# Patient Record
Sex: FEMALE | Race: WHITE | NOT HISPANIC OR LATINO | Employment: FULL TIME | URBAN - METROPOLITAN AREA
[De-identification: names, ages, dates, MRNs, and addresses within clinical notes are randomized per-mention and may not be internally consistent; named-entity substitution may affect disease eponyms.]

---

## 2018-11-18 ENCOUNTER — OFFICE VISIT (OUTPATIENT)
Dept: URGENT CARE | Age: 28
End: 2018-11-18
Payer: COMMERCIAL

## 2018-11-18 VITALS
HEART RATE: 81 BPM | WEIGHT: 167 LBS | HEIGHT: 63 IN | DIASTOLIC BLOOD PRESSURE: 55 MMHG | RESPIRATION RATE: 16 BRPM | TEMPERATURE: 98.6 F | SYSTOLIC BLOOD PRESSURE: 118 MMHG | BODY MASS INDEX: 29.59 KG/M2 | OXYGEN SATURATION: 98 %

## 2018-11-18 DIAGNOSIS — H10.9 CONJUNCTIVITIS OF BOTH EYES, UNSPECIFIED CONJUNCTIVITIS TYPE: Primary | ICD-10-CM

## 2018-11-18 PROCEDURE — 99203 OFFICE O/P NEW LOW 30 MIN: CPT | Performed by: PHYSICIAN ASSISTANT

## 2018-11-18 RX ORDER — OFLOXACIN 3 MG/ML
1 SOLUTION/ DROPS OPHTHALMIC 4 TIMES DAILY
Qty: 5 ML | Refills: 0 | Status: SHIPPED | OUTPATIENT
Start: 2018-11-18 | End: 2018-11-25

## 2018-11-18 NOTE — PATIENT INSTRUCTIONS
Use Ofloxacin as prescribed  Do not wear contact lenses for duration of treatment  Follow up with ophthalmologist or optometrist within 24-72 hours if not resolved  Tylenol/Ibuprofen for pain  Change pillowcase daily  Do not wear eye makeup while symptoms persist  Follow up with PCP in 3-5 days  Proceed to  ER if symptoms worsen  Conjunctivitis   WHAT YOU SHOULD KNOW:   Conjunctivitis, or pink eye, is inflammation of your conjunctiva  The conjunctiva is a thin tissue that covers the front of your eye and the back of your eyelids  The conjunctiva helps protect your eye and keep it moist         INSTRUCTIONS:   Medicines:   · Allergy medicine: This medicine helps decrease itchy, red, swollen eyes caused by allergies  It may be given as a pill, eye drops, or nasal spray  · Antibiotics:  You will need antibiotics if your conjunctivitis is caused by bacteria  This medicine may be given as eye drops or eye ointment  · Steroid medicine: This medicine helps decrease inflammation  It may be given as a pill, eye drops, or nasal spray  · Take your medicine as directed  Call your healthcare provider if you think your medicine is not helping or if you have side effects  Tell him if you are allergic to any medicine  Keep a list of the medicines, vitamins, and herbs you take  Include the amounts, and when and why you take them  Bring the list or the pill bottles to follow-up visits  Carry your medicine list with you in case of an emergency  Follow up with your primary healthcare provider as directed: You may need to return for more tests on your eyes  These will help your primary healthcare provider check for eye damage  Write down your questions so you remember to ask them during your visits  Avoid the spread of conjunctivitis:   · Wash your hands often:  Wash your hands before you touch your eyes  Also wash your hands before you prepare or eat food and after you use the bathroom or change a diaper      · Avoid allergens:  Try to avoid the things that cause your allergies, such as pets, dust, or grass  · Avoid contact:  Do not share towels or washcloths  Try to stay away from others as much as possible  Ask when you can return to work or school  · Throw away eye makeup:  Throw away mascara and other eye makeup  Manage your symptoms:  · Apply a cool compress:  Wet a washcloth with cold water and place it on your eye  This will help decrease swelling  · Use eye drops:  Eye drops, or artificial tears, can be bought without a doctor's order  They help keep your eye moist     · Do not wear contact lenses: They can irritate your eye  Throw away the pair you are using and ask when you can wear them again  Use a new pair of lenses when your primary healthcare provider says it is okay  · Flush your eye:  You may need to flush your eye with saline to help decrease your symptoms  Ask for more information on how to flush your eye  Contact your primary healthcare provider if:   · Your eyesight becomes blurry  · You have tiny bumps or spots of blood on your eye  · You have questions or concerns about your condition or care  Return to the emergency department if:   · The swelling in your eye gets worse, even after treatment  · Your vision suddenly becomes worse or you cannot see at all  · Your eye begins to bleed  © 2014 [address] is for End User's use only and may not be sold, redistributed or otherwise used for commercial purposes  All illustrations and images included in CareNotes® are the copyrighted property of [employer] D [employer]  or Shmuel Reyes  The above information is an  only  It is not intended as medical advice for individual conditions or treatments  Talk to your doctor, nurse or pharmacist before following any medical regimen to see if it is safe and effective for you

## 2018-11-18 NOTE — PROGRESS NOTES
[facility]        NAME: Yovany Zarate is a 33 y o  female  : 1990    MRN: [id number]  DATE: 2018  TIME: 10:59 AM    Assessment and Plan   Conjunctivitis of both eyes, unspecified conjunctivitis type [H10 9]  1  Conjunctivitis of both eyes, unspecified conjunctivitis type  ofloxacin (OCUFLOX) 0 3 % ophthalmic solution         Patient Instructions     Use Ofloxacin as prescribed  Do not wear contact lenses for duration of treatment  Follow up with ophthalmologist or optometrist within 24-72 hours if not resolved  Tylenol/Ibuprofen for pain  Change pillowcase daily  Do not wear eye makeup while symptoms persist  Follow up with PCP in 3-5 days  Proceed to  ER if symptoms worsen  Chief Complaint     Chief Complaint   Patient presents with    Eye Redness     3 days; itchy red, and crusty; wears contacts         History of Present Illness       Eye Problem    Both eyes are affected  This is a new problem  The current episode started in the past 7 days  The problem occurs constantly  The problem has been gradually worsening  The injury mechanism was contact lenses  The pain is mild (worse at night)  There is no known exposure to pink eye  She wears contacts  Associated symptoms include an eye discharge (crusting and mucopurulent discharge), eye redness and itching  Pertinent negatives include no blurred vision, double vision, fever, foreign body sensation, nausea, photophobia, recent URI or vomiting  Associated symptoms comments: Difficulty opening eyes in morning due to discharge    Treatments tried: OTC homeopathic eye drops  The treatment provided mild relief  Review of Systems   Review of Systems   Constitutional: Negative for activity change, appetite change and fever  HENT: Negative  Eyes: Positive for discharge (crusting and mucopurulent discharge), redness and itching  Negative for blurred vision, double vision, photophobia, pain and visual disturbance  Gastrointestinal: Negative for abdominal pain, nausea and vomiting  Musculoskeletal: Negative for arthralgias and joint swelling  Neurological: Negative for dizziness, light-headedness and headaches  Current Medications       Current Outpatient Prescriptions:     ofloxacin (OCUFLOX) 0 3 % ophthalmic solution, Administer 1 drop to both eyes 4 (four) times a day for 7 days, Disp: 5 mL, Rfl: 0    Current Allergies     Allergies as of 11/18/2018 - never reviewed   Allergen Reaction Noted    Penicillins  11/18/2018    Sulfa antibiotics  11/18/2018            The following portions of the patient's history were reviewed and updated as appropriate: allergies, current medications, past family history, past medical history, past social history, past surgical history and problem list      No past medical history on file  No past surgical history on file  No family history on file  Medications have been verified  Objective   /55   Pulse 81   Temp 98 6 °F (37 °C)   Resp 16   Ht 5' 3" (1 6 m)   Wt 75 8 kg (167 lb)   SpO2 98%   BMI 29 58 kg/m²        Physical Exam     Physical Exam   Constitutional: She appears well-developed and well-nourished  No distress  HENT:   Right Ear: External ear normal    Left Ear: External ear normal    Mouth/Throat: Oropharynx is clear and moist    Eyes: Pupils are equal, round, and reactive to light  EOM are normal  Right eye exhibits no discharge  Left eye exhibits no discharge  Scleral injected and conjunctival erythematous bilaterally  Cardiovascular: Normal rate, regular rhythm and normal heart sounds  Exam reveals no gallop and no friction rub  No murmur heard  Pulmonary/Chest: Effort normal and breath sounds normal  No respiratory distress  She has no wheezes  She has no rales  She exhibits no tenderness  Lymphadenopathy:     She has no cervical adenopathy  Neurological: She is alert  Skin: Skin is warm  She is not diaphoretic  Psychiatric: She has a normal mood and affect  Her behavior is normal  Judgment and thought content normal    Vitals reviewed

## 2019-07-11 ENCOUNTER — APPOINTMENT (EMERGENCY)
Dept: CT IMAGING | Facility: HOSPITAL | Age: 29
End: 2019-07-11
Payer: COMMERCIAL

## 2019-07-11 ENCOUNTER — HOSPITAL ENCOUNTER (EMERGENCY)
Facility: HOSPITAL | Age: 29
Discharge: HOME | End: 2019-07-11
Attending: EMERGENCY MEDICINE | Admitting: EMERGENCY MEDICINE
Payer: COMMERCIAL

## 2019-07-11 VITALS
TEMPERATURE: 98.2 F | WEIGHT: 164 LBS | BODY MASS INDEX: 29.06 KG/M2 | SYSTOLIC BLOOD PRESSURE: 139 MMHG | OXYGEN SATURATION: 100 % | HEIGHT: 63 IN | RESPIRATION RATE: 18 BRPM | DIASTOLIC BLOOD PRESSURE: 68 MMHG | HEART RATE: 77 BPM

## 2019-07-11 DIAGNOSIS — R19.7 DIARRHEA: ICD-10-CM

## 2019-07-11 DIAGNOSIS — G43.809 OTHER MIGRAINE WITHOUT STATUS MIGRAINOSUS, NOT INTRACTABLE: Primary | ICD-10-CM

## 2019-07-11 LAB
EXT PREG TEST URINE: NEGATIVE
EXT. CONTROL ED NAV: NORMAL

## 2019-07-11 PROCEDURE — 70450 CT HEAD/BRAIN W/O DYE: CPT

## 2019-07-11 PROCEDURE — 81025 URINE PREGNANCY TEST: CPT

## 2019-07-11 PROCEDURE — 99284 EMERGENCY DEPT VISIT MOD MDM: CPT

## 2019-07-11 NOTE — DISCHARGE INSTRUCTIONS
Can continue Excedrin for headache as needed  Try Pepto-Bismol for diarrhea or if it becomes severe try Imodium    Follow up with neurologist
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
## 2022-01-12 ENCOUNTER — NURSE TRIAGE (OUTPATIENT)
Dept: OTHER | Facility: OTHER | Age: 32
End: 2022-01-12

## 2022-01-12 DIAGNOSIS — Z20.822 SUSPECTED SEVERE ACUTE RESPIRATORY SYNDROME CORONAVIRUS 2 (SARS-COV-2) INFECTION: Primary | ICD-10-CM

## 2022-01-12 NOTE — TELEPHONE ENCOUNTER
1  Were you within 6 feet or less, for up to 15 minutes or more with a person that has a confirmed COVID-19 test? Yes-so-worker- Positive results 1/10/22  2  What was the date of your exposure? 1/8/22  3  Are you experiencing any symptoms attributed to the virus?  (Assess for SOB, cough, fever, difficulty breathing) runny nose, cough, congestion, headache - s/s yesterday 1/11/22  4  HIGH RISK: Do you have any history heart or lung conditions, weakened immune system, diabetes, Asthma, CHF, HIV, COPD, Chemo, renal failure, sickle cell, etc? Denies  5  PREGNANCY: Are you pregnant or did you recently give birth? NO- ? LMP-Tubal ligation  6   VACCINE: "Have you gotten the COVID-19 vaccine?" If Yes ask: "Which one, how many shots, when did you get it?" 2 doses of Pfizer vaccine-  Booster also    Reason for Disposition   [1] COVID-19 infection suspected by caller or triager AND [2] mild symptoms (cough, fever, or others) AND [3] has not gotten tested yet    Protocols used: CORONAVIRUS (COVID-19) DIAGNOSED OR SUSPECTED-ADULT-[place]

## 2022-01-12 NOTE — TELEPHONE ENCOUNTER
Regarding: COVID, runny nose, congestion, cough  ----- Message from Hong Rajan sent at 1/12/2022  2:22 PM EST -----  "I have a very runny nose and congestion as well as a cough; I was exposed "

## 2023-01-26 ENCOUNTER — OFFICE VISIT (OUTPATIENT)
Dept: ENDOCRINOLOGY | Facility: CLINIC | Age: 33
End: 2023-01-26

## 2023-01-26 VITALS
WEIGHT: 165 LBS | RESPIRATION RATE: 16 BRPM | DIASTOLIC BLOOD PRESSURE: 70 MMHG | HEIGHT: 63 IN | SYSTOLIC BLOOD PRESSURE: 124 MMHG | TEMPERATURE: 98.4 F | BODY MASS INDEX: 29.23 KG/M2

## 2023-01-26 DIAGNOSIS — N92.6 IRREGULAR MENSTRUATION: ICD-10-CM

## 2023-01-26 DIAGNOSIS — R53.83 OTHER FATIGUE: Primary | ICD-10-CM

## 2023-01-26 DIAGNOSIS — R40.0 DAYTIME SLEEPINESS: ICD-10-CM

## 2023-01-26 RX ORDER — FERROUS SULFATE 325(65) MG
TABLET ORAL
COMMUNITY

## 2023-01-26 NOTE — PROGRESS NOTES
Sommer Diaz 34 y o  female MRN: [id number]    Encounter: [id number]      Assessment/Plan     1  Other fatigue  2  Irregular menstruation  3  Daytime sleepiness  4  Acne,     We reviewed different causes for fatigue including mood changes/depression, hypothyroidism/hyperthyroidism, sleep disturbances, sleep apnea, anemia and vitamin D deficiency  She does not need concerning features for other hormonal deficiency causing fatigue including adrenal insufficiency or growth hormone deficiency  I ordered following blood work and will do further work-up pending results  Given history of irregular menstruation and hirsutism and acne, PCOS might be a diagnosis, I ordered testosterone and DHEA-S  He was referred to sleep medicine     - CBC and differential Lab Collect; Future  - Iron Panel (Includes Ferritin, Iron Sat%, Iron, and TIBC); Future  - Vitamin D 25 hydroxy Lab Collect; Future  - T4, free Lab Collect; Future  - TSH, 3rd generation Lab Collect; Future  - DHEA-sulfate Lab Collect; Future  - Testosterone, free, total Lab Collect; Future  - Ambulatory referral to Sleep Medicine; Future    I have spent 40 minutes with Patient  today in which greater than 50% of this time was spent in counseling/coordination of care regarding Intructions for management, Patient and family education and Impressions  CC:   Fatigue,    History of Present Illness     HPI:  Karli Callahan  Lisseth Wiseman is a 34year-old who presented for feeling fatigue, lack of energy and irregular menstruations  She reports Fatigue, lack of energy, Sleep sleep apnea not feeling referreshed in the morning, feeling anxious for a long period of the time  She does snore  She reports abdominal obesity and bloating, her weight has been stable since 2019  She   She reports Irregular menstruation, cycles have been always irregular, and they are very heavy  Reports Mood changes, not on medication   Muscle weakness, negative  Denies hirsutism  Acne : on back and arm  Clear colored stretch jarett   She has easily brushing   No hair, skin and nail changes  Her hands are falling sleep instantly  2 kids all with C- section  Anemia during pregnancy  Report low sex drive  No history of thyroid disease, family of hypothyroidism in her mothter  Works for [employer] for [employer],  Denies smoking cigarette  drinking alcohol or using drugs  Review of Systems   Constitutional: Positive for appetite change and fatigue  Negative for activity change, chills, diaphoresis, fever and unexpected weight change  HENT: Negative for congestion, drooling, ear discharge, ear pain, trouble swallowing and voice change  Eyes: Negative for photophobia, pain, discharge, redness, itching and visual disturbance  Respiratory: Negative for cough, chest tightness, shortness of breath and wheezing  Cardiovascular: Negative for chest pain, palpitations and leg swelling  Gastrointestinal: Negative for abdominal distention, abdominal pain, blood in stool, diarrhea, nausea and vomiting  Endocrine: Positive for cold intolerance  Negative for heat intolerance, polydipsia, polyphagia and polyuria  Genitourinary: Negative for dysuria, flank pain, frequency and hematuria  Musculoskeletal: Negative for back pain, gait problem, joint swelling, myalgias, neck pain and neck stiffness  Skin: Negative for color change, pallor, rash and wound  Neurological: Negative for dizziness, tremors, seizures, syncope, speech difficulty, weakness, numbness and headaches  Psychiatric/Behavioral: Positive for sleep disturbance  Negative for agitation  The patient is nervous/anxious  All other systems reviewed and are negative        Historical Information   Past Medical History:   Diagnosis Date   • Migraine      Past Surgical History:   Procedure Laterality Date   •  SECTION       Social History   Social History     Substance and Sexual Activity   Alcohol Use Yes    Comment: rare     Social History     Substance and Sexual Activity   Drug Use Never     Social History     Tobacco Use   Smoking Status Former   • Packs/day: 1 00   • Years: 15 00   • Pack years: 15 00   • Types: Cigarettes   • Quit date: 2022   • Years since quittin 4   Smokeless Tobacco Never     Family History:   Family History   Problem Relation Age of Onset   • Thyroid disease unspecified Mother    • Breast cancer Mother    • Melanoma Father        Meds/Allergies   Current Outpatient Medications   Medication Sig Dispense Refill   • ferrous sulfate 325 (65 Fe) mg tablet Take by mouth (Patient not taking: Reported on 2023)     • Vit C-Cholecalciferol-Sierra Hip 500-1000-20 MG-UNIT-MG CAPS  (Patient not taking: Reported on 2023)       No current facility-administered medications for this visit  Allergies   Allergen Reactions   • Sulfamethoxazole-Trimethoprim Other (See Comments)     Other reaction(s): Hives   • Oxycodone-Acetaminophen GI Intolerance   • Penicillins    • Sulfa Antibiotics        Objective   Vitals: Blood pressure 124/70, temperature 98 4 °F (36 9 °C), resp  rate 16, height 5' 3" (1 6 m), weight 74 8 kg (165 lb), not currently breastfeeding  Physical Exam  Constitutional:       Appearance: She is well-developed  HENT:      Head: Normocephalic and atraumatic  Nose: Nose normal    Eyes:      Pupils: Pupils are equal, round, and reactive to light  Neck:      Thyroid: No thyromegaly  Vascular: No JVD  Cardiovascular:      Rate and Rhythm: Normal rate and regular rhythm  Heart sounds: Normal heart sounds  No murmur heard  No friction rub  No gallop  Pulmonary:      Effort: Pulmonary effort is normal  No respiratory distress  Breath sounds: Normal breath sounds  No stridor  No wheezing or rales  Chest:      Chest wall: No tenderness  Abdominal:      General: Bowel sounds are normal  There is no distension        Palpations: Abdomen is soft  There is no mass  Tenderness: There is no abdominal tenderness  There is no guarding  Musculoskeletal:         General: No deformity  Normal range of motion  Cervical back: Normal range of motion  Skin:     General: Skin is warm  Neurological:      Mental Status: She is alert and oriented to person, place, and time  The history was obtained from the review of the chart, patient  Lab Results:    not available     Imaging Studies:   not available     I have personally reviewed pertinent reports  Portions of the record may have been created with voice recognition software  Occasional wrong word or "sound a like" substitutions may have occurred due to the inherent limitations of voice recognition software  Read the chart carefully and recognize, using context, where substitutions have occurred

## 2023-01-27 ENCOUNTER — APPOINTMENT (OUTPATIENT)
Dept: LAB | Facility: CLINIC | Age: 33
End: 2023-01-27

## 2023-01-27 DIAGNOSIS — N92.6 IRREGULAR MENSTRUATION: ICD-10-CM

## 2023-01-27 DIAGNOSIS — E55.9 VITAMIN D DEFICIENCY: Primary | ICD-10-CM

## 2023-01-27 DIAGNOSIS — R40.0 DAYTIME SLEEPINESS: ICD-10-CM

## 2023-01-27 DIAGNOSIS — R53.83 OTHER FATIGUE: ICD-10-CM

## 2023-01-27 LAB
25(OH)D3 SERPL-MCNC: 12.2 NG/ML (ref 30–100)
BASOPHILS # BLD AUTO: 0.04 THOUSANDS/ÂΜL (ref 0–0.1)
BASOPHILS NFR BLD AUTO: 1 % (ref 0–1)
EOSINOPHIL # BLD AUTO: 0.09 THOUSAND/ÂΜL (ref 0–0.61)
EOSINOPHIL NFR BLD AUTO: 1 % (ref 0–6)
ERYTHROCYTE [DISTWIDTH] IN BLOOD BY AUTOMATED COUNT: 12.1 % (ref 11.6–15.1)
FERRITIN SERPL-MCNC: 40 NG/ML (ref 8–388)
HCT VFR BLD AUTO: 43.8 % (ref 34.8–46.1)
HGB BLD-MCNC: 14.2 G/DL (ref 11.5–15.4)
IMM GRANULOCYTES # BLD AUTO: 0.01 THOUSAND/UL (ref 0–0.2)
IMM GRANULOCYTES NFR BLD AUTO: 0 % (ref 0–2)
IRON SATN MFR SERPL: 19 % (ref 15–50)
IRON SERPL-MCNC: 74 UG/DL (ref 50–170)
LYMPHOCYTES # BLD AUTO: 1.57 THOUSANDS/ÂΜL (ref 0.6–4.47)
LYMPHOCYTES NFR BLD AUTO: 23 % (ref 14–44)
MCH RBC QN AUTO: 28.4 PG (ref 26.8–34.3)
MCHC RBC AUTO-ENTMCNC: 32.4 G/DL (ref 31.4–37.4)
MCV RBC AUTO: 88 FL (ref 82–98)
MONOCYTES # BLD AUTO: 0.38 THOUSAND/ÂΜL (ref 0.17–1.22)
MONOCYTES NFR BLD AUTO: 6 % (ref 4–12)
NEUTROPHILS # BLD AUTO: 4.61 THOUSANDS/ÂΜL (ref 1.85–7.62)
NEUTS SEG NFR BLD AUTO: 69 % (ref 43–75)
NRBC BLD AUTO-RTO: 0 /100 WBCS
PLATELET # BLD AUTO: 255 THOUSANDS/UL (ref 149–390)
PMV BLD AUTO: 11.7 FL (ref 8.9–12.7)
RBC # BLD AUTO: 5 MILLION/UL (ref 3.81–5.12)
T4 FREE SERPL-MCNC: 0.98 NG/DL (ref 0.76–1.46)
TIBC SERPL-MCNC: 393 UG/DL (ref 250–450)
TSH SERPL DL<=0.05 MIU/L-ACNC: 1.08 UIU/ML (ref 0.45–4.5)
WBC # BLD AUTO: 6.7 THOUSAND/UL (ref 4.31–10.16)

## 2023-01-27 RX ORDER — ERGOCALCIFEROL 1.25 MG/1
50000 CAPSULE ORAL 3 TIMES WEEKLY
Qty: 20 CAPSULE | Refills: 0 | Status: SHIPPED | OUTPATIENT
Start: 2023-01-27 | End: 2023-03-14

## 2023-01-28 LAB
DHEA-S SERPL-MCNC: 325 UG/DL (ref 84.8–378)
TESTOST FREE SERPL-MCNC: 2.8 PG/ML (ref 0–4.2)
TESTOST SERPL-MCNC: 28 NG/DL (ref 8–60)

## 2024-04-22 ENCOUNTER — OFFICE VISIT (OUTPATIENT)
Dept: URGENT CARE | Facility: CLINIC | Age: 34
End: 2024-04-22
Payer: COMMERCIAL

## 2024-04-22 VITALS
SYSTOLIC BLOOD PRESSURE: 130 MMHG | WEIGHT: 168 LBS | BODY MASS INDEX: 28.68 KG/M2 | RESPIRATION RATE: 18 BRPM | OXYGEN SATURATION: 100 % | DIASTOLIC BLOOD PRESSURE: 68 MMHG | HEIGHT: 64 IN | TEMPERATURE: 98.7 F | HEART RATE: 79 BPM

## 2024-04-22 DIAGNOSIS — B35.4 TINEA CORPORIS: Primary | ICD-10-CM

## 2024-04-22 PROCEDURE — 99214 OFFICE O/P EST MOD 30 MIN: CPT | Performed by: NURSE PRACTITIONER

## 2024-04-22 RX ORDER — KETOCONAZOLE 20 MG/G
CREAM TOPICAL DAILY
Qty: 60 G | Refills: 1 | Status: SHIPPED | OUTPATIENT
Start: 2024-04-22 | End: 2024-05-13

## 2024-04-22 NOTE — PATIENT INSTRUCTIONS
You have been prescribed ketaconazole cream for ringworm.  Use daily x 28 days. Apply with a 4x4. Don't use your hands the cream may get under your nails.  Follow up with your PCP in 3-5 days   Go to the ED if symptoms worsen  Do not wear tight clothing - this can make you sweat more and cause burning and itching